# Patient Record
Sex: FEMALE | URBAN - METROPOLITAN AREA
[De-identification: names, ages, dates, MRNs, and addresses within clinical notes are randomized per-mention and may not be internally consistent; named-entity substitution may affect disease eponyms.]

---

## 2023-09-20 ENCOUNTER — APPOINTMENT (RX ONLY)
Dept: URBAN - METROPOLITAN AREA CLINIC 86 | Facility: CLINIC | Age: 53
Setting detail: DERMATOLOGY
End: 2023-09-20

## 2023-09-20 DIAGNOSIS — D18.0 HEMANGIOMA: ICD-10-CM | Status: INADEQUATELY CONTROLLED

## 2023-09-20 DIAGNOSIS — Z85.828 PERSONAL HISTORY OF OTHER MALIGNANT NEOPLASM OF SKIN: ICD-10-CM | Status: RESOLVED

## 2023-09-20 DIAGNOSIS — L57.8 OTHER SKIN CHANGES DUE TO CHRONIC EXPOSURE TO NONIONIZING RADIATION: ICD-10-CM | Status: INADEQUATELY CONTROLLED

## 2023-09-20 DIAGNOSIS — L259 CONTACT DERMATITIS AND OTHER ECZEMA, UNSPECIFIED CAUSE: ICD-10-CM | Status: INADEQUATELY CONTROLLED

## 2023-09-20 DIAGNOSIS — D22 MELANOCYTIC NEVI: ICD-10-CM | Status: STABLE

## 2023-09-20 PROBLEM — D22.5 MELANOCYTIC NEVI OF TRUNK: Status: ACTIVE | Noted: 2023-09-20

## 2023-09-20 PROBLEM — L23.9 ALLERGIC CONTACT DERMATITIS, UNSPECIFIED CAUSE: Status: ACTIVE | Noted: 2023-09-20

## 2023-09-20 PROBLEM — D22.62 MELANOCYTIC NEVI OF LEFT UPPER LIMB, INCLUDING SHOULDER: Status: ACTIVE | Noted: 2023-09-20

## 2023-09-20 PROBLEM — D18.01 HEMANGIOMA OF SKIN AND SUBCUTANEOUS TISSUE: Status: ACTIVE | Noted: 2023-09-20

## 2023-09-20 PROCEDURE — 99203 OFFICE O/P NEW LOW 30 MIN: CPT

## 2023-09-20 PROCEDURE — ? REFERRAL CORRESPONDENCE

## 2023-09-20 PROCEDURE — ? ADDITIONAL NOTES

## 2023-09-20 PROCEDURE — ? COUNSELING

## 2023-09-20 PROCEDURE — ? PRESCRIPTION

## 2023-09-20 PROCEDURE — ? PRESCRIPTION MEDICATION MANAGEMENT

## 2023-09-20 RX ORDER — TACROLIMUS 1 MG/G
ONE APPLICATION OINTMENT TOPICAL BID
Qty: 30 | Refills: 2 | Status: ERX | COMMUNITY
Start: 2023-09-20

## 2023-09-20 RX ADMIN — TACROLIMUS ONE APPLICATION: 1 OINTMENT TOPICAL at 00:00

## 2023-09-20 ASSESSMENT — LOCATION ZONE DERM
LOCATION ZONE: TRUNK
LOCATION ZONE: LEG
LOCATION ZONE: EYELID
LOCATION ZONE: NECK
LOCATION ZONE: ARM

## 2023-09-20 ASSESSMENT — LOCATION SIMPLE DESCRIPTION DERM
LOCATION SIMPLE: RIGHT PRETIBIAL REGION
LOCATION SIMPLE: ABDOMEN
LOCATION SIMPLE: LEFT CALF
LOCATION SIMPLE: RIGHT POSTERIOR UPPER ARM
LOCATION SIMPLE: LEFT POSTERIOR UPPER ARM
LOCATION SIMPLE: LEFT UPPER ARM
LOCATION SIMPLE: CHEST
LOCATION SIMPLE: LEFT THIGH
LOCATION SIMPLE: RIGHT SUPERIOR EYELID
LOCATION SIMPLE: LEFT PRETIBIAL REGION
LOCATION SIMPLE: LEFT SUPERIOR EYELID
LOCATION SIMPLE: RIGHT ANTERIOR NECK
LOCATION SIMPLE: LEFT UPPER BACK
LOCATION SIMPLE: RIGHT CALF

## 2023-09-20 ASSESSMENT — SEVERITY ASSESSMENT: SEVERITY: MILD TO MODERATE

## 2023-09-20 ASSESSMENT — LOCATION DETAILED DESCRIPTION DERM
LOCATION DETAILED: MIDDLE STERNUM
LOCATION DETAILED: SUBXIPHOID
LOCATION DETAILED: LEFT MEDIAL SUPERIOR EYELID
LOCATION DETAILED: RIGHT PROXIMAL POSTERIOR UPPER ARM
LOCATION DETAILED: LEFT SUPERIOR MEDIAL UPPER BACK
LOCATION DETAILED: RIGHT MEDIAL SUPERIOR EYELID
LOCATION DETAILED: RIGHT PROXIMAL CALF
LOCATION DETAILED: LEFT PROXIMAL POSTERIOR UPPER ARM
LOCATION DETAILED: LEFT ANTERIOR PROXIMAL THIGH
LOCATION DETAILED: UPPER STERNUM
LOCATION DETAILED: RIGHT CLAVICULAR NECK
LOCATION DETAILED: LEFT ANTERIOR PROXIMAL UPPER ARM
LOCATION DETAILED: LEFT PROXIMAL PRETIBIAL REGION
LOCATION DETAILED: RIGHT PROXIMAL PRETIBIAL REGION
LOCATION DETAILED: LEFT PROXIMAL CALF

## 2023-09-20 ASSESSMENT — BSA RASH: BSA RASH: 2

## 2023-09-20 NOTE — HPI: FULL BODY SKIN EXAMINATION
How Severe Are Your Spot(S)?: mild
What Type Of Note Output Would You Prefer (Optional)?: Standard Output
What Is The Reason For Today's Visit?: Full Body Skin Examination
What Is The Reason For Today's Visit? (Being Monitored For X): the risk of recurrence of previously treated lesion(s)
Additional History: Pt states that her MOHS procedure has caused a significant injury to her nostril in 2016. Pt states she wakes up unable to breath due to the cartilage in her nose that was collapsed during MOHS.

## 2023-09-20 NOTE — PROCEDURE: PRESCRIPTION MEDICATION MANAGEMENT
Detail Level: Zone
Initiate Treatment: Tacrolimus ointment BID
Render In Strict Bullet Format?: No
Plan: -Discussed possible triggers\\n-Scheduled for patch testing

## 2023-10-09 ENCOUNTER — APPOINTMENT (RX ONLY)
Dept: URBAN - METROPOLITAN AREA CLINIC 86 | Facility: CLINIC | Age: 53
Setting detail: DERMATOLOGY
End: 2023-10-09

## 2023-10-09 VITALS — WEIGHT: 98 LBS | HEIGHT: 63 IN

## 2023-10-09 DIAGNOSIS — L259 CONTACT DERMATITIS AND OTHER ECZEMA, UNSPECIFIED CAUSE: ICD-10-CM

## 2023-10-09 PROBLEM — L23.9 ALLERGIC CONTACT DERMATITIS, UNSPECIFIED CAUSE: Status: ACTIVE | Noted: 2023-10-09

## 2023-10-09 PROCEDURE — ? COUNSELING

## 2023-10-09 PROCEDURE — ? PATCH TESTING

## 2023-10-09 PROCEDURE — 95044 PATCH/APPLICATION TESTS: CPT

## 2023-10-09 PROCEDURE — ? ADDITIONAL NOTES

## 2023-10-09 ASSESSMENT — LOCATION ZONE DERM: LOCATION ZONE: TRUNK

## 2023-10-09 ASSESSMENT — LOCATION SIMPLE DESCRIPTION DERM: LOCATION SIMPLE: LEFT UPPER BACK

## 2023-10-09 ASSESSMENT — LOCATION DETAILED DESCRIPTION DERM: LOCATION DETAILED: LEFT MID-UPPER BACK

## 2023-10-09 NOTE — PROCEDURE: PATCH TESTING
Number Of Patches (Maximum Allowable Per Dos By Cms Is 90): 90
Post-Care Instructions: I reviewed with the patient in detail post-care instructions. Patient should not sweat, pick at, or get the patches wet for 48 hours.
Detail Level: None
Consent: Written consent obtained, risks reviewed including but not limited to rash, itching, allergic reaction, systemic rash, remote possiblity of anaphylaxis to allergen.

## 2023-10-09 NOTE — PROCEDURE: ADDITIONAL NOTES
Additional Notes: ACDS Core 2020 Series of 90 allergens
Detail Level: Simple
Render Risk Assessment In Note?: yes

## 2023-10-11 ENCOUNTER — APPOINTMENT (RX ONLY)
Dept: URBAN - METROPOLITAN AREA CLINIC 86 | Facility: CLINIC | Age: 53
Setting detail: DERMATOLOGY
End: 2023-10-11

## 2023-10-11 DIAGNOSIS — L259 CONTACT DERMATITIS AND OTHER ECZEMA, UNSPECIFIED CAUSE: ICD-10-CM | Status: INADEQUATELY CONTROLLED

## 2023-10-11 PROBLEM — L23.9 ALLERGIC CONTACT DERMATITIS, UNSPECIFIED CAUSE: Status: ACTIVE | Noted: 2023-10-11

## 2023-10-11 PROCEDURE — ? CORE ACDS PATCH TEST READING

## 2023-10-11 PROCEDURE — 99212 OFFICE O/P EST SF 10 MIN: CPT

## 2023-10-11 PROCEDURE — ? COUNSELING ALLERGENS

## 2023-10-11 ASSESSMENT — LOCATION ZONE DERM: LOCATION ZONE: TRUNK

## 2023-10-11 ASSESSMENT — LOCATION SIMPLE DESCRIPTION DERM: LOCATION SIMPLE: LEFT UPPER BACK

## 2023-10-11 ASSESSMENT — LOCATION DETAILED DESCRIPTION DERM: LOCATION DETAILED: LEFT MID-UPPER BACK

## 2023-10-11 NOTE — PROCEDURE: CORE ACDS PATCH TEST READING
Name Of Allergen 41: Mixed dialkyl thioureas
Name Of Allergen 17: Methylchlorisothiazolinone/Methylisothiazolinone
Allergen 31 Reaction: no reaction
Show Allergen Counseling In The Note?: Yes
Name Of Allergen 40: Cocamidopropyl betaine
Name Of Allergen 38: Iodopropynyl butylcarbamate
Name Of Allergen 6: Fragrance Mix I
Name Of Allergen 58: Cocamide LIANG (coconut LIANG)
Name Of Allergen 57: Sesquiterpene lactone Mix
Name Of Allergen 22: Mercapto Mix
Name Of Allergen 72: Clobetasol-17 propionate
Name Of Allergen 67: Sorbitan sesquioleate
Name Of Allergen 90: 4-Chloro-3-cresol (PCMC)
Name Of Allergen 34: Fragrance Mix II
Name Of Allergen 11: Ethylenediamine dihydrochloride
Name Of Allergen 84: Disperse Yellow3 1% pet
Name Of Allergen 23: Bronopol 2-Bromo-2-nitropropane-1,3-diol
Name Of Allergen 56: Tosylamide formaldehyde resin
Name Of Allergen 50: Ethyl acrylate
Name Of Allergen 47: Lavender absolute
Name Of Allergen 15: Carba Mix
Name Of Allergen 36: Lidocaine
Name Of Allergen 8: Paraben Mix
Name Of Allergen 46: Methyl methacrylate
Name Of Allergen 78: BHT (2-Ditert-butyl-4-cresol)
Name Of Allergen 20: p-Phenylenediamine
Name Of Allergen 65: Compositae Mix
Name Of Allergen 18: Quaternium 15 (Dowicil 200)
Name Of Allergen 88: Shellac 20% alcohol
Name Of Allergen 77: Benzoic acid
Number Of Patches Read: 90
Name Of Allergen 85: Lily 2% pet
Name Of Allergen 54: Chloroxylenol (PCMX)
Name Of Allergen 52: Chlorhexidine digluconate
Name Of Allergen 12: Cobalt chloride
Name Of Allergen 39: Polymyxin B
Name Of Allergen 49: Tocopherol
Name Of Allergen 55: Benzophenone-3 (2-Hydroxy-4-methoxybenzophenone)
Detail Level: Zone
Name Of Allergen 79: 2-Ethylhexyl-4-methoxycinnamate (Parsol MCX)
Name Of Allergen 61: Benzophenone-4 (2-Hydroxy-4-methoxybenzophenone-5)
Name Of Allergen 21: Formaldehyde 2% aq
Name Of Allergen 9: Methylisothiazolinone
Name Of Allergen 25: Diazolidinyl urea (Germall II)
Name Of Allergen 3: Neomycin
Name Of Allergen 4: Potassium dichromate
Name Of Allergen 29: Imidazolidinyl urea (Germal 115)
Name Of Allergen 70: Ethylhexylglycerin
Name Of Allergen 73: Amidoamine
Name Of Allergen 83: Benzyl Salicylate 10% pet
Name Of Allergen 69: Lyral5% pet
Name Of Allergen 7: Colophony
Name Of Allergen 81: Cetyl steryl alcohol
Name Of Allergen 89: Lauryl polyglucose 3.0% pet
Name Of Allergen 24: Thiuram Mix
Name Of Allergen 68: n,n-Diphenylguanidine
Name Of Allergen 13: z-jise-Exhhtggiksp formaldehyde resin
Name Of Allergen 30: Budesonide
Name Of Allergen 53: Propolis
Name Of Allergen 43: HEMA Hydroxyethyl methacrylate
Name Of Allergen 42: 3-(Dimethylamino)-propylamine
Name Of Allergen 74: Ethyl cyanoacrylate
Name Of Allergen 76: Disperse Orange 3
Name Of Allergen 31: Hydrocortisone-17-butyrate
Name Of Allergen 64: Ylang-Ylang oil (Cananga odorata)
Name Of Allergen 60: Benzalkonium chloride
Name Of Allergen 63: Sorbic acid
Name Of Allergen 44: Oleamidopropyl dimethylamine
Name Of Allergen 71: Triamcinolone
Name Of Allergen 82: [place] 2.5% pet
Name Of Allergen 80: Benzyl alcohol
What Reading Time Point?: 48 hour
Name Of Allergen 87: Pramoxine HCL 2% pet
Name Of Allergen 32: Mercaptobenzothiazole
Name Of Allergen 14: Epoxy Resin (Bisphenol A)
Name Of Allergen 26: Benzocaine
Name Of Allergen 48: Cinnamal - Cinnamic aldehyde
Name Of Allergen 19: Linalool 5%pet
Name Of Allergen 28: Gold sodium thiosulfate
Name Of Allergen 51: Tea tree oil
Name Of Allergen 27: [id number]-[id number]
Name Of Allergen 33: Bacitracin
Name Of Allergen 45: Decyl glucoside
Name Of Allergen 86: Peppermint 2% pet
Name Of Allergen 2: Lanolin (Amerchol 101)
Name Of Allergen 35: Disperse Blue 106/124 Mix
Name Of Allergen 75: Phenoxyethanol
Name Of Allergen 62: Sodium benzoate
Name Of Allergen 16: Black Rubber Mix
Name Of Allergen 59: Limonene 0.5% pet
Name Of Allergen 5: DMDM Hydantoin
Name Of Allergen 1: Nickel
Name Of Allergen 10: Balsam of [place] (Myroxylon pereirae)
Name Of Allergen 66: Ethyleneurea melanine-formaldehyde
Name Of Allergen 37: Propylene glycol

## 2023-10-12 ENCOUNTER — APPOINTMENT (RX ONLY)
Dept: URBAN - METROPOLITAN AREA CLINIC 86 | Facility: CLINIC | Age: 53
Setting detail: DERMATOLOGY
End: 2023-10-12

## 2023-10-12 DIAGNOSIS — L259 CONTACT DERMATITIS AND OTHER ECZEMA, UNSPECIFIED CAUSE: ICD-10-CM | Status: INADEQUATELY CONTROLLED

## 2023-10-12 PROBLEM — L23.9 ALLERGIC CONTACT DERMATITIS, UNSPECIFIED CAUSE: Status: ACTIVE | Noted: 2023-10-12

## 2023-10-12 PROCEDURE — ? CORE ACDS PATCH TEST READING

## 2023-10-12 PROCEDURE — ? COUNSELING ALLERGENS

## 2023-10-12 PROCEDURE — 99212 OFFICE O/P EST SF 10 MIN: CPT

## 2023-10-12 ASSESSMENT — LOCATION SIMPLE DESCRIPTION DERM: LOCATION SIMPLE: LEFT UPPER BACK

## 2023-10-12 ASSESSMENT — LOCATION ZONE DERM: LOCATION ZONE: TRUNK

## 2023-10-12 ASSESSMENT — LOCATION DETAILED DESCRIPTION DERM: LOCATION DETAILED: LEFT MID-UPPER BACK

## 2023-10-12 NOTE — PROCEDURE: CORE ACDS PATCH TEST READING
Allergen 47 Reaction: no reaction
Name Of Allergen 32: Mercaptobenzothiazole
Name Of Allergen 67: Sorbitan sesquioleate
Name Of Allergen 68: n,n-Diphenylguanidine
Name Of Allergen 70: Ethylhexylglycerin
Name Of Allergen 71: Triamcinolone
Name Of Allergen 52: Chlorhexidine digluconate
Name Of Allergen 50: Ethyl acrylate
Name Of Allergen 24: Thiuram Mix
Name Of Allergen 43: HEMA Hydroxyethyl methacrylate
Name Of Allergen 17: Methylchlorisothiazolinone/Methylisothiazolinone
Name Of Allergen 65: Compositae Mix
Name Of Allergen 82: [place] 2.5% pet
Name Of Allergen 41: Mixed dialkyl thioureas
Name Of Allergen 59: Limonene 0.2%pet
Name Of Allergen 2: Lanolin (Amerchol 101)
Name Of Allergen 48: Cinnamal - Cinnamic aldehyde
Name Of Allergen 28: Gold sodium thiosulfate
Name Of Allergen 35: Disperse Blue 106/124 Mix
Name Of Allergen 11: Ethylenediamine dihydrochloride
Name Of Allergen 4: Potassium dichromate
Name Of Allergen 25: Diazolidinyl urea (Germall II)
Name Of Allergen 55: Benzophenone-3 (2-Hydroxy-4-methoxybenzophenone)
Name Of Allergen 42: 3-(Dimethylamino)-propylamine
Name Of Allergen 40: Cocamidopropyl betaine
Name Of Allergen 90: 4-Chloro-3-cresol (PCMC)
Name Of Allergen 78: BHT (2-Ditert-butyl-4-cresol)
Name Of Allergen 62: Sodium benzoate
Allergen 15 Reaction: +/-
Name Of Allergen 85: Lily 2% pet
What Reading Time Point?: 72 hour
Name Of Allergen 83: Benzyl Salicylate 10% pet
Name Of Allergen 81: Cetyl steryl alcohol
Number Of Patches Read: 90
Name Of Allergen 88: Shellac 20% alcohol
Allergen 68 Reaction: 1+
Name Of Allergen 27: [id number]
Name Of Allergen 7: Colophony
Show Allergen Counseling In The Note?: No
Name Of Allergen 37: Propylene glycol
Name Of Allergen 16: Black Rubber Mix
Name Of Allergen 49: Tocopherol
Name Of Allergen 39: Polymyxin B
Name Of Allergen 46: Methyl methacrylate
Name Of Allergen 30: Budesonide
Name Of Allergen 89: Lauryl polyglucose 3.0% pet
Name Of Allergen 79: 2-Ethylhexyl-4-methoxycinnamate (Parsol MCX)
Name Of Allergen 20: p-Phenylenediamine
Name Of Allergen 38: Iodopropynyl butylcarbamate
Name Of Allergen 60: Benzalkonium chloride
Name Of Allergen 33: Bacitracin
Name Of Allergen 64: Ylang-Ylang oil (Cananga odorata)
Name Of Allergen 73: Amidoamine
Name Of Allergen 69: Lyral 5% pet
Name Of Allergen 36: Lidocaine
Name Of Allergen 31: Hydrocortisone-17-butyrate
Name Of Allergen 44: Oleamidopropyl dimethylamine
Name Of Allergen 5: DMDM Hydantoin
Name Of Allergen 1: Nickel
Name Of Allergen 8: Paraben Mix
Name Of Allergen 72: Clobetasol-17 propionate
Name Of Allergen 58: Cocamide LIANG (coconut LIANG)
Name Of Allergen 51: Tea tree oil
Name Of Allergen 66: Ethyleneurea melanine-formaldehyde
Name Of Allergen 6: Fragrance Mix I
Name Of Allergen 19: Linalool 0.5%pet
Name Of Allergen 74: Ethyl cyanoacrylate
Name Of Allergen 57: Sesquiterpene lactone Mix
Name Of Allergen 77: Benzoic acid
Name Of Allergen 47: Lavender absolute
Name Of Allergen 21: Formaldehyde 2% aq
Name Of Allergen 22: Mercapto Mix
Name Of Allergen 14: Epoxy Resin (Bisphenol A)
Name Of Allergen 34: Fragrance Mix II
Detail Level: Zone
Name Of Allergen 53: Propolis
Name Of Allergen 12: Cobalt chloride
Name Of Allergen 75: Phenoxyethanol
Name Of Allergen 15: Carba Mix
Allergen 27 Reaction: 2+
Name Of Allergen 63: Sorbic acid
Name Of Allergen 61: Benzophenone-4 (2-Hydroxy-4-methoxybenzophenone-5)
Name Of Allergen 9: Methylisothiazolinone
Name Of Allergen 45: Decyl glucoside
Name Of Allergen 76: Disperse Orange 3
Name Of Allergen 54: Chloroxylenol (PCMX)
Name Of Allergen 86: Peppermint 2% pet
Name Of Allergen 13: j-elpm-Luonmqridiy formaldehyde resin
Name Of Allergen 23: Bronopol 2-Bromo-2-nitropropane-1,3-diol
Name Of Allergen 18: Quaternium 15 (Dowicil 200)
Name Of Allergen 26: Benzocaine
Name Of Allergen 80: Benzyl alcohol
Name Of Allergen 56: Tosylamide formaldehyde resin
Name Of Allergen 3: Neomycin
Name Of Allergen 84: Disperse Yellow3 1% pet
Name Of Allergen 29: Imidazolidinyl urea (Germal 115)
Show Negative Results In The Note?: Yes
Name Of Allergen 87: Pramoxine HCL 2% pet
Name Of Allergen 10: Balsam of [place] (Myroxylon pereirae)

## 2023-12-12 NOTE — PROCEDURE: ADDITIONAL NOTES
BAT: .213      Nicky Carolina  12/12/23 1011    
CATCH at bedside.     Nicky Carolina  12/12/23 1013    
CATCH aware of patient.     Rosetta Lamb RN  12/12/23 2353    
Per Catch, patient [place]  at 4:30pm to transfer to other facility.      Nicky Carolina  12/12/23 1416       Nicky Carolina  12/12/23 1416    
Render Risk Assessment In Note?: yes
Additional Notes: -Left nostril in 2016.\\n-Pt states that her MOHS procedure has caused a significant injury to her nostril in 2016. Pt states she wakes up unable to breath due to the cartilage in her nose that was collapsed during MOHS.\\n-Pt referred to Dr. Villarreal for possible reconstruction of the nose.
Detail Level: Simple